# Patient Record
Sex: FEMALE | Race: WHITE
[De-identification: names, ages, dates, MRNs, and addresses within clinical notes are randomized per-mention and may not be internally consistent; named-entity substitution may affect disease eponyms.]

---

## 2017-06-13 ENCOUNTER — HOSPITAL ENCOUNTER (EMERGENCY)
Dept: HOSPITAL 17 - PHED | Age: 52
Discharge: HOME | End: 2017-06-13
Payer: COMMERCIAL

## 2017-06-13 VITALS
DIASTOLIC BLOOD PRESSURE: 89 MMHG | SYSTOLIC BLOOD PRESSURE: 136 MMHG | OXYGEN SATURATION: 99 % | RESPIRATION RATE: 20 BRPM | HEART RATE: 75 BPM

## 2017-06-13 VITALS
TEMPERATURE: 97.8 F | HEART RATE: 61 BPM | SYSTOLIC BLOOD PRESSURE: 143 MMHG | DIASTOLIC BLOOD PRESSURE: 94 MMHG | RESPIRATION RATE: 16 BRPM | OXYGEN SATURATION: 97 %

## 2017-06-13 VITALS — WEIGHT: 167.55 LBS | BODY MASS INDEX: 26.93 KG/M2 | HEIGHT: 66 IN

## 2017-06-13 DIAGNOSIS — N95.0: Primary | ICD-10-CM

## 2017-06-13 LAB
ALP SERPL-CCNC: 78 U/L (ref 45–117)
ALT SERPL-CCNC: 35 U/L (ref 10–53)
ANION GAP SERPL CALC-SCNC: 10 MEQ/L (ref 5–15)
AST SERPL-CCNC: 15 U/L (ref 15–37)
BASOPHILS # BLD AUTO: 0.1 TH/MM3 (ref 0–0.2)
BASOPHILS NFR BLD: 0.8 % (ref 0–2)
BILIRUB SERPL-MCNC: 0.7 MG/DL (ref 0.2–1)
BUN SERPL-MCNC: 13 MG/DL (ref 7–18)
CHLORIDE SERPL-SCNC: 109 MEQ/L (ref 98–107)
COLOR UR: YELLOW
COMMENT (UR): (no result)
CULTURE IF INDICATED: (no result)
EOSINOPHIL # BLD: 0.2 TH/MM3 (ref 0–0.4)
EOSINOPHIL NFR BLD: 2.3 % (ref 0–4)
ERYTHROCYTE [DISTWIDTH] IN BLOOD BY AUTOMATED COUNT: 12.4 % (ref 11.6–17.2)
GFR SERPLBLD BASED ON 1.73 SQ M-ARVRAT: 106 ML/MIN (ref 89–?)
GLUCOSE UR STRIP-MCNC: (no result) MG/DL
HCO3 BLD-SCNC: 25.5 MEQ/L (ref 21–32)
HCT VFR BLD CALC: 38.2 % (ref 35–46)
HEMO FLAGS: (no result)
HGB UR QL STRIP: (no result)
KETONES UR STRIP-MCNC: (no result) MG/DL
LEUKOCYTE ESTERASE UR QL STRIP: (no result) /HPF (ref 0–5)
LYMPHOCYTES # BLD AUTO: 2.4 TH/MM3 (ref 1–4.8)
LYMPHOCYTES NFR BLD AUTO: 27 % (ref 9–44)
MCH RBC QN AUTO: 30.7 PG (ref 27–34)
MCHC RBC AUTO-ENTMCNC: 33.9 % (ref 32–36)
MCV RBC AUTO: 90.5 FL (ref 80–100)
METHOD OF COLLECTION: (no result)
MONOCYTES NFR BLD: 5.2 % (ref 0–8)
MUCOUS THREADS #/AREA URNS LPF: (no result) /LPF
NEUTROPHILS # BLD AUTO: 5.5 TH/MM3 (ref 1.8–7.7)
NEUTROPHILS NFR BLD AUTO: 64.7 % (ref 16–70)
NITRITE UR QL STRIP: (no result)
PLATELET # BLD: 113 TH/MM3 (ref 150–450)
POTASSIUM SERPL-SCNC: 3.5 MEQ/L (ref 3.5–5.1)
RBC # BLD AUTO: 4.22 MIL/MM3 (ref 4–5.3)
RBC #/AREA URNS HPF: (no result) /HPF (ref 0–3)
SODIUM SERPL-SCNC: 144 MEQ/L (ref 136–145)
SP GR UR STRIP: 1.02 (ref 1–1.03)
SQUAMOUS #/AREA URNS HPF: (no result) /HPF (ref 0–5)
WBC # BLD AUTO: 8.7 TH/MM3 (ref 4–11)

## 2017-06-13 PROCEDURE — 85025 COMPLETE CBC W/AUTO DIFF WBC: CPT

## 2017-06-13 PROCEDURE — 96374 THER/PROPH/DIAG INJ IV PUSH: CPT

## 2017-06-13 PROCEDURE — 81001 URINALYSIS AUTO W/SCOPE: CPT

## 2017-06-13 PROCEDURE — 96361 HYDRATE IV INFUSION ADD-ON: CPT

## 2017-06-13 PROCEDURE — 76856 US EXAM PELVIC COMPLETE: CPT

## 2017-06-13 PROCEDURE — 80053 COMPREHEN METABOLIC PANEL: CPT

## 2017-06-13 PROCEDURE — 99285 EMERGENCY DEPT VISIT HI MDM: CPT

## 2017-06-13 NOTE — RADHPO
EXAM DATE/TIME:  2017 16:01 

 

[facility] COMPARISON:     

No previous studies available for comparison.

        

 

 

INDICATIONS :     

Post menopausal bleeding. 

                     

 

MEDICAL HISTORY :     

Pancreatitis.     Seizures. Bipolar disorder. Depression. Anxiety. 

 

SURGICAL HISTORY :     

Tonsillectomy. Cholecystectomy.  section. Removal of choledocholithiasis. 

 

ENCOUNTER:     

Initial

 

ACUITY:     

2 days

 

PAIN SCORE:     

1/10

 

LOCATION:     

Bilateral pelvis 

                     

MEASUREMENTS:     

 

UTERUS:                                  

10.4 x 4.3 x 6.4 cm 

 

ENDOMETRIAL STRIPE:      

5 mm 

 

RIGHT OVARY:                      

1.9 x 1.2 x 1.2 cm 

 

LEFT OVARY:                         

2.4 x 1.2 x 1.8 cm 

 

FINDINGS:     

 

UTERUS:     

The myometrium has homogeneous echotexture without mass.  Endometrial stripe is homogeneous.

 

RIGHT OVARY:     

Ovary contains no mass or significant cystic lesion.  

 

LEFT OVARY:     

Ovary contains no mass or significant cystic lesion.  

 

MISCELLANEOUS:     

No free fluid.  

 

CONCLUSION:     

1. Unremarkable pelvic ultrasound examination. Specifically, homogeneous normal appearing endometrial
 stripe. 

 

 

 Jermaine Howard MD on 2017 at 16:25           

Board Certified Radiologist.

 This report was verified electronically.

## 2017-06-13 NOTE — PD
HPI


Chief Complaint:  Gyn Problem/Complaint


Time Seen by Provider:  15:10


Travel History


International Travel<30 days:  No


Contact w/Intl Traveler<30days:  No


Traveled to known affect area:  No





History of Present Illness


HPI


52-year-old female complains of vaginal bleeding.  Patient states that she went 

through menopause at age 48 which is 4 years ago.  Patient started having low 

back pain and abdominal cramping pain about 10 days ago.  Patient states that 

the pain lasted for several days.  Patient started having vaginal bleeding for 

the past 6 days since then.  Patient denies any headache.  Patient denies any 

chest pain or shortness of breath.  Patient denies abdominal pain now.  Patient 

denies any dysuria or frequency.  Patient denies any fever chills.  Patient 

denies any back pain now.  Patient has history of anxiety and taking Valium as 

needed for that.





PFSH


Past Medical History


Hx Anticoagulant Therapy:  No


ADD:  Yes


Bipolar Disorder:  Yes


Anxiety:  Yes


Depression:  Yes


Diabetes:  No


Diminished Hearing:  No


Pancreatitis:  Yes


Pregnant?:  Not Pregnant


Menopausal:  Yes





Past Surgical History


 Section:  Yes (, , )


Cholecystectomy:  Yes ()


Pacemaker:  No


Tonsillectomy:  Yes (CHILDHOOD)





Social History


Alcohol Use:  No (PT STATES IN RECOVERY started 5 wks.ago)


Tobacco Use:  No (QUIT 5 WKS. AGO)


Substance Use:  Yes (MARIJUANA))





Allergies-Medications


(Allergen,Severity, Reaction):  


Coded Allergies:  


     No Known Allergies (Verified , 17)


Reported Meds & Prescriptions





Reported Meds & Active Scripts


Active


Reported


Estradiol 1 Mg Tab 1 Mg PO DAILY


Trazodone (Trazodone HCl) 100 Mg Tablet 150 Mg PO BID


Diazepam 5 Mg Tab 5 Mg PO Q4HR PRN


Sertraline (Sertraline HCl) 100 Mg Tab 100 Mg PO DAILY


Progesterone Micronized 100 Mg Cap 100 Mg PO DAILY


Temazepam 15 Mg Cap 15 Mg PO HS PRN








Review of Systems


General / Constitutional:  No: Fever


Eyes:  No: Visual changes


HENT:  No: Headaches


Cardiovascular:  No: Chest Pain or Discomfort


Respiratory:  No: Shortness of Breath


Gastrointestinal:  Positive: Abdominal Pain


Genitourinary:  Positive: Pelvic Pain, Vaginal Bleeding,  No: Dysuria


Musculoskeletal:  No: Pain


Skin:  No Rash


Neurologic:  No: Weakness


Psychiatric:  No: Depression


Endocrine:  No: Polydipsia


Hematologic/Lymphatic:  No: Easy Bruising





Physical Exam


Narrative


GENERAL: Well-nourished, well-developed patient.


SKIN: Focused skin assessment warm/dry.


HEAD: Normocephalic.


EYES: No scleral icterus. No injection or drainage. 


NECK: Supple, trachea midline. No JVD or lymphadenopathy.


CARDIOVASCULAR: Regular rate and rhythm without murmurs, gallops, or rubs. 


RESPIRATORY: Breath sounds equal bilaterally. No accessory muscle use.


GASTROINTESTINAL: Abdomen soft, non-tender, nondistended. 


MUSCULOSKELETAL: No cyanosis, or edema. 


BACK: Nontender without obvious deformity. No CVA tenderness. 


GYN exam: Patient has small amount of blood in the vaginal vault.  The cervix 

is closed.  Uterus is mildly enlarged and nontender on palpation.  No adnexal 

mass or tenderness.





Data


Data


Last Documented VS





Vital Signs








  Date Time  Temp Pulse Resp B/P Pulse Ox O2 Delivery O2 Flow Rate FiO2


 


17 15:48  75 20 136/89 99 Room Air  


 


17 14:25 97.8       








Orders





 Complete Blood Count With Diff (17 15:18)


Comprehensive Metabolic Panel (17 15:18)


Us Pelvis Comp Gyn/Non-Preg (17 )


Urinalysis - C+S If Indicated (17 15:18)


Iv Access Insert/Monitor (17 15:18)


Ecg Monitoring (17 15:18)


Sodium Chlor 0.9% 1000 Ml Inj (Ns 1000 M (17 15:30)


Lorazepam Inj (Ativan Inj) (17 15:30)








MDM


Medical Decision Making


Medical Screen Exam Complete:  Yes


Emergency Medical Condition:  Yes


Differential Diagnosis


Differential diagnosis including breakthrough bleeding, postmenopausal bleeding

, uterine mass.


Narrative Course


52-year-old female complains of postmenopausal vaginal bleeding.





Diagnosis





 Primary Impression:  


 Post-menopausal bleeding








Jerzy Dozier MD 2017 15:25

## 2017-06-13 NOTE — PD
Physical Exam


Narrative


Received sign out from previous team to follow up labs and US.





53yo F with post menopausal vaginal bleeding for 6 days.  VS stable.  Labs 

reviewed, no leukocytosis.  H/H stable at 12.9/38.2.  Mild thrombocytopenia at 

113,000, pt had mild thrombocytopenia before.  CMP unremarkable.  Pt has no 

abdominal tenderness on exam.  US unremarkable.  Normal endometrial stripe.  Pt 

states she is on estrogen and progesterone for menopausal symptoms.  This 

bleeding may be related to hormone use but pt still needs to follow up with GYN 

for endometrial biopsy to r/o endometrial cancer in vaginal bleeding in post 

menopausal woman.  Instructed pt to follow up with GYN as outpatient.





Data


Data


Last Documented VS





Vital Signs








  Date Time  Temp Pulse Resp B/P Pulse Ox O2 Delivery O2 Flow Rate FiO2


 


6/13/17 15:48  75 20 136/89 99 Room Air  


 


6/13/17 14:25 97.8       








Orders





 Complete Blood Count With Diff (6/13/17 15:18)


Comprehensive Metabolic Panel (6/13/17 15:18)


Us Pelvis Comp Gyn/Non-Preg (6/13/17 )


Urinalysis - C+S If Indicated (6/13/17 15:18)


Iv Access Insert/Monitor (6/13/17 15:18)


Ecg Monitoring (6/13/17 15:18)


Sodium Chlor 0.9% 1000 Ml Inj (Ns 1000 M (6/13/17 15:30)


Lorazepam Inj (Ativan Inj) (6/13/17 15:30)





Labs





 Laboratory Tests








Test 6/13/17 6/13/17





 15:20 15:33


 


Urine Collection Type CLEAN CATCH  


 


Urine Color YELLOW  


 


Urine Turbidity CLEAR  


 


Urine pH 6.0  


 


Urine Specific Gravity 1.025  


 


Urine Protein NEG mg/dL 


 


Urine Glucose (UA) NEG mg/dL 


 


Urine Ketones NEG mg/dL 


 


Urine Occult Blood LARGE  


 


Urine Nitrite NEG  


 


Urine Bilirubin NEG  


 


Urine Leukocyte Esterase NEG  


 


Urine RBC 15-19 /hpf 


 


Urine WBC 0-2 /hpf 


 


Urine Squamous Epithelial 6-8 /hpf 





Cells  


 


Urine Mucus FEW /lpf 


 


Microscopic Urinalysis Comment CULT NOT 





 INDICATED 


 


Urine Collection Time 15:20  


 


White Blood Count  8.7 TH/MM3


 


Red Blood Count  4.22 MIL/MM3


 


Hemoglobin  12.9 GM/DL


 


Hematocrit  38.2 %


 


Mean Corpuscular Volume  90.5 FL


 


Mean Corpuscular Hemoglobin  30.7 PG


 


Mean Corpuscular Hemoglobin  33.9 %





Concent  


 


Red Cell Distribution Width  12.4 %


 


Platelet Count  113 TH/MM3


 


Mean Platelet Volume  10.1 FL


 


Neutrophils (%) (Auto)  64.7 %


 


Lymphocytes (%) (Auto)  27.0 %


 


Monocytes (%) (Auto)  5.2 %


 


Eosinophils (%) (Auto)  2.3 %


 


Basophils (%) (Auto)  0.8 %


 


Neutrophils # (Auto)  5.5 TH/MM3


 


Lymphocytes # (Auto)  2.4 TH/MM3


 


Monocytes # (Auto)  0.5 TH/MM3


 


Eosinophils # (Auto)  0.2 TH/MM3


 


Basophils # (Auto)  0.1 TH/MM3


 


CBC Comment  DIFF FINAL 


 


Differential Comment   


 


Sodium Level  144 MEQ/L


 


Potassium Level  3.5 MEQ/L


 


Chloride Level  109 MEQ/L


 


Carbon Dioxide Level  25.5 MEQ/L


 


Anion Gap  10 MEQ/L


 


Blood Urea Nitrogen  13 MG/DL


 


Creatinine  0.59 MG/DL


 


Estimat Glomerular Filtration  106 ML/MIN





Rate  


 


Random Glucose  91 MG/DL


 


Calcium Level  8.5 MG/DL


 


Total Bilirubin  0.7 MG/DL


 


Aspartate Amino Transf  15 U/L





(AST/SGOT)  


 


Alanine Aminotransferase  35 U/L





(ALT/SGPT)  


 


Alkaline Phosphatase  78 U/L


 


Total Protein  7.1 GM/DL


 


Albumin  3.8 GM/DL











MDM


Supervised Visit with EZEQUIEL:  No


Diagnosis





 Primary Impression:  


 Post-menopausal bleeding


Referrals:  


Heather Head MD


call for appointment


Post menopausal bleeding.


Patient Instructions:  General Instructions


Departure Forms:  Tests/Procedures





***Additional Instruction:


Please follow up with gynecologist in 1-2 days for work up of post menopausal 

bleeding.


***Med/Other Pt SpecificInfo:  No Change to Meds


Disposition:  01 DISCHARGE HOME


Condition:  Stable (ERASED)








Ciara Crabtree DO Jun 13, 2017 16:34

## 2018-06-04 ENCOUNTER — HOSPITAL ENCOUNTER (EMERGENCY)
Dept: HOSPITAL 17 - PHED | Age: 53
Discharge: HOME | End: 2018-06-04
Payer: SELF-PAY

## 2018-06-04 VITALS — HEART RATE: 87 BPM | RESPIRATION RATE: 16 BRPM | TEMPERATURE: 97.5 F | OXYGEN SATURATION: 100 %

## 2018-06-04 VITALS — DIASTOLIC BLOOD PRESSURE: 92 MMHG | SYSTOLIC BLOOD PRESSURE: 140 MMHG

## 2018-06-04 VITALS — HEIGHT: 65 IN | WEIGHT: 180.78 LBS | BODY MASS INDEX: 30.12 KG/M2

## 2018-06-04 DIAGNOSIS — F12.90: ICD-10-CM

## 2018-06-04 DIAGNOSIS — K29.00: Primary | ICD-10-CM

## 2018-06-04 DIAGNOSIS — Z87.891: ICD-10-CM

## 2018-06-04 DIAGNOSIS — K51.90: ICD-10-CM

## 2018-06-04 DIAGNOSIS — F31.9: ICD-10-CM

## 2018-06-04 DIAGNOSIS — F41.9: ICD-10-CM

## 2018-06-04 LAB
ALBUMIN SERPL-MCNC: 4 GM/DL (ref 3.4–5)
ALP SERPL-CCNC: 103 U/L (ref 45–117)
ALT SERPL-CCNC: 58 U/L (ref 10–53)
AST SERPL-CCNC: 31 U/L (ref 15–37)
BACTERIA #/AREA URNS HPF: (no result) /HPF
BASOPHILS # BLD AUTO: 0.2 TH/MM3 (ref 0–0.2)
BASOPHILS NFR BLD: 1.9 % (ref 0–2)
BILIRUB SERPL-MCNC: 0.8 MG/DL (ref 0.2–1)
BUN SERPL-MCNC: 12 MG/DL (ref 7–18)
CALCIUM SERPL-MCNC: 8.9 MG/DL (ref 8.5–10.1)
CHLORIDE SERPL-SCNC: 106 MEQ/L (ref 98–107)
COLOR UR: YELLOW
CREAT SERPL-MCNC: 0.57 MG/DL (ref 0.5–1)
EOSINOPHIL # BLD: 0.2 TH/MM3 (ref 0–0.4)
EOSINOPHIL NFR BLD: 2.2 % (ref 0–4)
ERYTHROCYTE [DISTWIDTH] IN BLOOD BY AUTOMATED COUNT: 11.8 % (ref 11.6–17.2)
GFR SERPLBLD BASED ON 1.73 SQ M-ARVRAT: 110 ML/MIN (ref 89–?)
GLUCOSE SERPL-MCNC: 88 MG/DL (ref 74–106)
GLUCOSE UR STRIP-MCNC: (no result) MG/DL
HCO3 BLD-SCNC: 23 MEQ/L (ref 21–32)
HCT VFR BLD CALC: 44.1 % (ref 35–46)
HGB BLD-MCNC: 15.2 GM/DL (ref 11.6–15.3)
HGB UR QL STRIP: (no result)
INR PPP: 1.1 RATIO
KETONES UR STRIP-MCNC: (no result) MG/DL
LYMPHOCYTES # BLD AUTO: 2.9 TH/MM3 (ref 1–4.8)
LYMPHOCYTES NFR BLD AUTO: 30.2 % (ref 9–44)
MCH RBC QN AUTO: 30.9 PG (ref 27–34)
MCHC RBC AUTO-ENTMCNC: 34.5 % (ref 32–36)
MCV RBC AUTO: 89.4 FL (ref 80–100)
MONOCYTE #: 0.7 TH/MM3 (ref 0–0.9)
MONOCYTES NFR BLD: 7.4 % (ref 0–8)
MUCOUS THREADS #/AREA URNS LPF: (no result) /LPF
NEUTROPHILS # BLD AUTO: 5.5 TH/MM3 (ref 1.8–7.7)
NEUTROPHILS NFR BLD AUTO: 58.3 % (ref 16–70)
NITRITE UR QL STRIP: (no result)
PLATELET # BLD: 146 TH/MM3 (ref 150–450)
PMV BLD AUTO: 9.6 FL (ref 7–11)
PROT SERPL-MCNC: 7.9 GM/DL (ref 6.4–8.2)
PROTHROMBIN TIME: 11.2 SEC (ref 9.8–11.6)
RBC # BLD AUTO: 4.94 MIL/MM3 (ref 4–5.3)
RBC #/AREA URNS HPF: (no result) /HPF (ref 0–3)
SODIUM SERPL-SCNC: 138 MEQ/L (ref 136–145)
SP GR UR STRIP: 1.02 (ref 1–1.03)
SQUAMOUS #/AREA URNS HPF: (no result) /HPF (ref 0–5)
TROPONIN I SERPL-MCNC: (no result) NG/ML (ref 0.02–0.05)
URINE LEUKOCYTE ESTERASE: (no result)
WBC # BLD AUTO: 9.5 TH/MM3 (ref 4–11)

## 2018-06-04 PROCEDURE — 81001 URINALYSIS AUTO W/SCOPE: CPT

## 2018-06-04 PROCEDURE — 74177 CT ABD & PELVIS W/CONTRAST: CPT

## 2018-06-04 PROCEDURE — 83690 ASSAY OF LIPASE: CPT

## 2018-06-04 PROCEDURE — 85730 THROMBOPLASTIN TIME PARTIAL: CPT

## 2018-06-04 PROCEDURE — 85025 COMPLETE CBC W/AUTO DIFF WBC: CPT

## 2018-06-04 PROCEDURE — 96375 TX/PRO/DX INJ NEW DRUG ADDON: CPT

## 2018-06-04 PROCEDURE — 96361 HYDRATE IV INFUSION ADD-ON: CPT

## 2018-06-04 PROCEDURE — 99285 EMERGENCY DEPT VISIT HI MDM: CPT

## 2018-06-04 PROCEDURE — 85610 PROTHROMBIN TIME: CPT

## 2018-06-04 PROCEDURE — 84484 ASSAY OF TROPONIN QUANT: CPT

## 2018-06-04 PROCEDURE — 80053 COMPREHEN METABOLIC PANEL: CPT

## 2018-06-04 PROCEDURE — C9113 INJ PANTOPRAZOLE SODIUM, VIA: HCPCS

## 2018-06-04 PROCEDURE — 96374 THER/PROPH/DIAG INJ IV PUSH: CPT

## 2018-06-04 NOTE — PD
HPI


Chief Complaint:  Abdominal Pain


Time Seen by Provider:  19:08


Travel History


International Travel<30 days:  No


Contact w/Intl Traveler<30days:  No


Traveled to known affect area:  No





History of Present Illness


HPI


53-year-old female presented the ER for evaluation of abdominal pain.  

Abdominal pain rated 8 out of 10, comes and goes for the last few days, located 

in the right upper quadrant, dull in nature, nothing makes it better but 

movement makes it worse, associated with nausea but no vomiting.  Patient is 

been having the pain for the last 3 days but decided to come to the ER today 

since the pain is been getting worse.  She has no fever or shortness of breath 

or chest pain, she has no diarrhea and have a normal bowel movement.  Patient 

has history of gastritis in the past but states she does not take anything for 

it, she also has history of post manic stress disorder and ulcerative colitis.  

Patient states that she does not take anything for her ulcerative colitis, she 

only took half a pill of her psychiatric medications but she was about to throw 

it up, she tried cranberry juice for her pain but that did not help.  Patient 

had a colonoscopy about 2 years ago in an outside hospital and she states that 

how she was diagnosed with ulcerative colitis.





PFSH


Past Medical History


Hx Anticoagulant Therapy:  No


ADD:  Yes


Bipolar Disorder:  Yes


Anxiety:  Yes


Depression:  Yes


Diabetes:  No


Diminished Hearing:  No


Pancreatitis:  Yes


Pregnant?:  Not Pregnant


Menopausal:  Yes





Past Surgical History


 Section:  Yes (, , )


Cholecystectomy:  Yes ()


Pacemaker:  No


Tonsillectomy:  Yes (CHILDHOOD)





Social History


Alcohol Use:  No (PT STATES IN RECOVERY started 5 wks.ago)


Tobacco Use:  No (QUIT 5 WKS. AGO)


Substance Use:  Yes (MARIJUANA))





Allergies-Medications


(Allergen,Severity, Reaction):  


Coded Allergies:  


     No Known Allergies (Verified  Allergy, Unknown, 18)


Reported Meds & Prescriptions





Reported Meds & Active Scripts


Active


Reported


Tylenol (Acetaminophen) 325 Mg Tab 650 Mg PO Q4H PRN


Ibuprofen 400 Mg Tab 400 Mg PO Q6H PRN


Venlafaxine ER 24 HR (Venlafaxine HCl) 150 Mg Cap 150 Mg PO DAILY


Xanax (Alprazolam) 2 Mg Tab 2 Mg PO Q8H PRN


Trazodone (Trazodone HCl) 100 Mg Tablet 150 Mg PO BID


Temazepam 15 Mg Cap 15 Mg PO HS PRN








Review of Systems


Except as stated in HPI:  all other systems reviewed are Neg





Physical Exam


Narrative


GENERAL: Alert oriented 3 no acute distress


SKIN: Focused skin assessment warm/dry.


HEAD: Atraumatic. Normocephalic. 


EYES: Pupils equal and round. No scleral icterus. No injection or drainage. 


ENT: No nasal bleeding or discharge.  Mucous membranes pink and moist.


NECK: Trachea midline. No JVD. 


CARDIOVASCULAR: Regular rate and rhythm.  No murmur appreciated.


RESPIRATORY: No accessory muscle use. Clear to auscultation. Breath sounds 

equal bilaterally. 


GASTROINTESTINAL: Mild right upper quadrant tenderness without rebound, no 

masses, abdomen soft, distended. Hepatic and splenic margins not palpable. 


MUSCULOSKELETAL: No obvious deformities. No clubbing.  No cyanosis.  No edema. 


NEUROLOGICAL: Awake and alert. No obvious cranial nerve deficits.  Motor 

grossly within normal limits. Normal speech.


PSYCHIATRIC: Appropriate mood and affect; insight and judgment normal.





Data


Data


Last Documented VS





Vital Signs








  Date Time  Temp Pulse Resp B/P (MAP) Pulse Ox O2 Delivery O2 Flow Rate FiO2


 


18 19:05   20     


 


18 18:48 97.5 87   100   








Orders





 Orders


Complete Blood Count With Diff (18 19:08)


Comprehensive Metabolic Panel (18 19:08)


Lipase (18 19:08)


Act Partial Throm Time (Ptt) (18 19:08)


Prothrombin Time / Inr (Pt) (18 19:08)


Troponin I (18 19:08)


Urinalysis - C+S If Indicated (18 19:08)


Ct Abd/Pel W Iv Contrast(Rout) (18 )


Lorazepam Inj (Ativan Inj) (18 19:15)


Sodium Chlor 0.9% 1000 Ml Inj (Ns 1000 M (18 19:15)


Pantoprazole Inj (Protonix Inj) (18 19:15)


Iohexol 350 Inj (Omnipaque 350 Inj) (18 20:17)


Potassium Chloride (Kcl) (18 20:30)


Ed Discharge Order (18 20:53)





Labs





Laboratory Tests








Test


  18


19:30


 


White Blood Count 9.5 TH/MM3 


 


Red Blood Count 4.94 MIL/MM3 


 


Hemoglobin 15.2 GM/DL 


 


Hematocrit 44.1 % 


 


Mean Corpuscular Volume 89.4 FL 


 


Mean Corpuscular Hemoglobin 30.9 PG 


 


Mean Corpuscular Hemoglobin


Concent 34.5 % 


 


 


Red Cell Distribution Width 11.8 % 


 


Platelet Count 146 TH/MM3 


 


Mean Platelet Volume 9.6 FL 


 


Neutrophils (%) (Auto) 58.3 % 


 


Lymphocytes (%) (Auto) 30.2 % 


 


Monocytes (%) (Auto) 7.4 % 


 


Eosinophils (%) (Auto) 2.2 % 


 


Basophils (%) (Auto) 1.9 % 


 


Neutrophils # (Auto) 5.5 TH/MM3 


 


Lymphocytes # (Auto) 2.9 TH/MM3 


 


Monocytes # (Auto) 0.7 TH/MM3 


 


Eosinophils # (Auto) 0.2 TH/MM3 


 


Basophils # (Auto) 0.2 TH/MM3 


 


CBC Comment DIFF FINAL 


 


Differential Comment  


 


Prothrombin Time 11.2 SEC 


 


Prothromb Time International


Ratio 1.1 RATIO 


 


 


Activated Partial


Thromboplast Time 24.1 SEC 


 


 


Urine Color YELLOW 


 


Urine Turbidity CLEAR 


 


Urine pH 6.0 


 


Urine Specific Gravity 1.020 


 


Urine Protein NEG mg/dL 


 


Urine Glucose (UA) NEG mg/dL 


 


Urine Ketones NEG mg/dL 


 


Urine Occult Blood NEG 


 


Urine Nitrite NEG 


 


Urine Bilirubin NEG 


 


Urine Urobilinogen 0.2 MG/DL 


 


Urine Leukocyte Esterase NEG 


 


Urine RBC 0-3 /hpf 


 


Urine Squamous Epithelial


Cells 0-5 /hpf 


 


 


Urine Bacteria RARE /hpf 


 


Urine Mucus FEW /lpf 


 


Microscopic Urinalysis Comment


  CULT NOT


INDICATED


 


Blood Urea Nitrogen 12 MG/DL 


 


Creatinine 0.57 MG/DL 


 


Random Glucose 88 MG/DL 


 


Total Protein 7.9 GM/DL 


 


Albumin 4.0 GM/DL 


 


Calcium Level 8.9 MG/DL 


 


Alkaline Phosphatase 103 U/L 


 


Aspartate Amino Transf


(AST/SGOT) 31 U/L 


 


 


Alanine Aminotransferase


(ALT/SGPT) 58 U/L 


 


 


Total Bilirubin 0.8 MG/DL 


 


Sodium Level 138 MEQ/L 


 


Potassium Level 3.2 MEQ/L 


 


Chloride Level 106 MEQ/L 


 


Carbon Dioxide Level 23.0 MEQ/L 


 


Anion Gap 9 MEQ/L 


 


Estimat Glomerular Filtration


Rate 110 ML/MIN 


 


 


Troponin I


  LESS THAN 0.02


NG/ML


 


Lipase 122 U/L 











MDM


Medical Decision Making


Medical Screen Exam Complete:  Yes


Emergency Medical Condition:  Yes


Differential Diagnosis


Gastritis, GERD, perforation, peptic ulcer disease.


Narrative Course


53-year-old female here for evaluation of abdominal pain.  Physical examination 

is unremarkable, vitals are stable and she is not in acute distress.  Patient 

was given IV Protonix and she dramatically improved, labs are within normal 

limits, scan shows no acute findings.  Patient denies any vomiting or any blood 

in the stool and says that she feels much better after the Protonix.  I will 

give the patient a trial of Protonix to take home with her meanwhile I 

encouraged her to follow-up with his [facility] clinic and to return here if symptoms 

change or do not improve.  Patient may need EGD to rule out any peptic ulcer 

disease and she may need workup for H pylori if her symptoms continue to mother 

and I explained those issues with the patient and she understands.


Last 24 hours Impressions








Abdomen/Pelvis CT 18 0000 Signed





Impressions: 





 CONCLUSION:





 1.  No acute findings on abdomen and pelvic CT. Mild fatty liver. Previous chol





 ecystectomy.





  





 








 Laboratory Tests








Test


  18


19:30


 


White Blood Count 9.5 TH/MM3 


 


Red Blood Count 4.94 MIL/MM3 


 


Hemoglobin 15.2 GM/DL 


 


Hematocrit 44.1 % 


 


Mean Corpuscular Volume 89.4 FL 


 


Mean Corpuscular Hemoglobin 30.9 PG 


 


Mean Corpuscular Hemoglobin


Concent 34.5 % 


 


 


Red Cell Distribution Width 11.8 % 


 


Platelet Count 146 TH/MM3 


 


Mean Platelet Volume 9.6 FL 


 


Neutrophils (%) (Auto) 58.3 % 


 


Lymphocytes (%) (Auto) 30.2 % 


 


Monocytes (%) (Auto) 7.4 % 


 


Eosinophils (%) (Auto) 2.2 % 


 


Basophils (%) (Auto) 1.9 % 


 


Neutrophils # (Auto) 5.5 TH/MM3 


 


Lymphocytes # (Auto) 2.9 TH/MM3 


 


Monocytes # (Auto) 0.7 TH/MM3 


 


Eosinophils # (Auto) 0.2 TH/MM3 


 


Basophils # (Auto) 0.2 TH/MM3 


 


CBC Comment DIFF FINAL 


 


Differential Comment  


 


Prothrombin Time 11.2 SEC 


 


Prothromb Time International


Ratio 1.1 RATIO 


 


 


Activated Partial


Thromboplast Time 24.1 SEC 


 


 


Urine Color YELLOW 


 


Urine Turbidity CLEAR 


 


Urine pH 6.0 


 


Urine Specific Gravity 1.020 


 


Urine Protein NEG mg/dL 


 


Urine Glucose (UA) NEG mg/dL 


 


Urine Ketones NEG mg/dL 


 


Urine Occult Blood NEG 


 


Urine Nitrite NEG 


 


Urine Bilirubin NEG 


 


Urine Urobilinogen 0.2 MG/DL 


 


Urine Leukocyte Esterase NEG 


 


Urine RBC 0-3 /hpf 


 


Urine Squamous Epithelial


Cells 0-5 /hpf 


 


 


Urine Bacteria RARE /hpf 


 


Urine Mucus FEW /lpf 


 


Microscopic Urinalysis Comment


  CULT NOT


INDICATED


 


Blood Urea Nitrogen 12 MG/DL 


 


Creatinine 0.57 MG/DL 


 


Random Glucose 88 MG/DL 


 


Total Protein 7.9 GM/DL 


 


Albumin 4.0 GM/DL 


 


Calcium Level 8.9 MG/DL 


 


Alkaline Phosphatase 103 U/L 


 


Aspartate Amino Transf


(AST/SGOT) 31 U/L 


 


 


Alanine Aminotransferase


(ALT/SGPT) 58 U/L 


 


 


Total Bilirubin 0.8 MG/DL 


 


Sodium Level 138 MEQ/L 


 


Potassium Level 3.2 MEQ/L 


 


Chloride Level 106 MEQ/L 


 


Carbon Dioxide Level 23.0 MEQ/L 


 


Anion Gap 9 MEQ/L 


 


Estimat Glomerular Filtration


Rate 110 ML/MIN 


 


 


Troponin I


  LESS THAN 0.02


NG/ML


 


Lipase 122 U/L 











Diagnosis





 Primary Impression:  


 Gastritis


 Qualified Codes:  K29.00 - Acute gastritis without bleeding


Referrals:  


[facility]





***Additional Instructions:  


Follow-up with the clinic and return here if symptoms change or do not improve.


Scripts


Pantoprazole (Protonix) 40 Mg Tab


40 MG PO DAILY for Reflux, #14 TAB 0 Refills


   Prov: Javy Alfonso MD         18


Disposition:   DISCHARGE HOME


Condition:  Stable











Javy Alfonso MD 2018 19:14

## 2018-06-04 NOTE — RADRPT
EXAM DATE:  2018 8:20 PM EDT

AGE/SEX:        53 years / Female



INDICATIONS:  Right upper quadrant pain for two days.



CLINICAL DATA:  This is the patient's initial encounter. Patient reports that signs and symptoms have
 been present for 2 days and indicates a pain score of 8/10. 

                                                                          

MEDICAL/SURGICAL HISTORY:       Pancreatitis. Cholecystectomy.   section.



ORAL CONTRAST:   No oral contrast ingested.



RADIATION DOSE:  17.18 CTDI (mGy)









COMPARISON:      No prior [facility] exams available for comparison. 





TECHNIQUE:  Multiple contiguous axial images were obtained through the abdomen and pelvis following b
olus infusion of  100 ml Omnipaque 350 (iohexol)  nonionic water-soluble contrast as a single exam do
se. No oral contrast ingested.  Using automated exposure control and adjustment of the mA and/or kV a
ccording to patient size, the radiation dose was kept as low as reasonably achievable to obtain optim
al diagnostic quality images.



FINDINGS: 

Lung bases are clear.



Mild fatty liver. Spleen, adrenals, kidneys and pancreas unremarkable. Postoperative cholecystectomy.




There is no free air or free fluid. No bowel obstruction. No adenopathy. No acute bony abnormalities.




CONCLUSION:

1.  No acute findings on abdomen and pelvic CT. Mild fatty liver. Previous cholecystectomy.



Electronically signed by: Andrei Talavera MD  2018 8:40 PM EDT

## 2018-06-21 ENCOUNTER — HOSPITAL ENCOUNTER (EMERGENCY)
Dept: HOSPITAL 17 - PHEFT | Age: 53
Discharge: HOME | End: 2018-06-21
Payer: SELF-PAY

## 2018-06-21 VITALS — BODY MASS INDEX: 28.34 KG/M2 | HEIGHT: 66 IN | WEIGHT: 176.37 LBS

## 2018-06-21 VITALS
RESPIRATION RATE: 18 BRPM | OXYGEN SATURATION: 96 % | HEART RATE: 80 BPM | TEMPERATURE: 97.2 F | SYSTOLIC BLOOD PRESSURE: 152 MMHG | DIASTOLIC BLOOD PRESSURE: 90 MMHG

## 2018-06-21 DIAGNOSIS — K08.89: Primary | ICD-10-CM

## 2018-06-21 DIAGNOSIS — F31.9: ICD-10-CM

## 2018-06-21 DIAGNOSIS — K21.9: ICD-10-CM

## 2018-06-21 DIAGNOSIS — K05.10: ICD-10-CM

## 2018-06-21 DIAGNOSIS — Z87.891: ICD-10-CM

## 2018-06-21 DIAGNOSIS — F41.8: ICD-10-CM

## 2018-06-21 DIAGNOSIS — F98.8: ICD-10-CM

## 2018-06-21 PROCEDURE — 99283 EMERGENCY DEPT VISIT LOW MDM: CPT

## 2018-06-21 PROCEDURE — 96372 THER/PROPH/DIAG INJ SC/IM: CPT

## 2018-06-21 NOTE — PD
HPI


Chief Complaint:  Oral / Dental Pain or Problem


Time Seen by Provider:  17:57


Travel History


International Travel<30 days:  No


Contact w/Intl Traveler<30days:  No


Traveled to known affect area:  No





History of Present Illness


HPI


53-year-old female presents emergency department for evaluation of left 

maxillary second molar pain.  Pain has been ongoing for several weeks, but has 

become acutely worse over the last 2-3 days.  She denies any trauma.  No fever 

chills.  States pain is a constant, throbbing, 10 out of 10.  She feels like it 

is worse when she tries to bite down.  She denies any fever or chills.  She 

denies any nausea, vomiting, diarrhea.  She has no other symptoms to report.  

She does not have a dentist.





PFSH


Past Medical History


Hx Anticoagulant Therapy:  No


ADD:  Yes


Bipolar Disorder:  Yes


Anxiety:  Yes


Depression:  Yes


Diabetes:  No


Diminished Hearing:  No


GERD:  Yes


Neurologic:  Yes (PTSD)


Pancreatitis:  Yes


Pregnant?:  Not Pregnant


Menopausal:  Yes


:  4


Para:  4





Past Surgical History


Abdominal Surgery:  Yes (ENDOSCOPY)


 Section:  Yes (, , )


Cholecystectomy:  Yes ()


Pacemaker:  No


Tonsillectomy:  Yes (CHILDHOOD)





Social History


Alcohol Use:  No


Tobacco Use:  No (STATES QUIT 18)


Substance Use:  Yes (MARIJUANA))





Allergies-Medications


(Allergen,Severity, Reaction):  


Coded Allergies:  


     No Known Allergies (Verified  Allergy, Unknown, 18)


Reported Meds & Prescriptions





Reported Meds & Active Scripts


Active


Protonix (Pantoprazole Sodium) 40 Mg Tab 40 Mg PO DAILY


Reported


Tylenol (Acetaminophen) 325 Mg Tab 650 Mg PO Q4H PRN


Ibuprofen 400 Mg Tab 400 Mg PO Q6H PRN


Venlafaxine ER 24 HR (Venlafaxine HCl) 150 Mg Cap 150 Mg PO DAILY


Xanax (Alprazolam) 2 Mg Tab 2 Mg PO Q8H PRN


Trazodone (Trazodone HCl) 100 Mg Tablet 150 Mg PO BID


Temazepam 15 Mg Cap 15 Mg PO HS PRN








Review of Systems


Except as stated in HPI:  all other systems reviewed are Neg





Physical Exam


Narrative


GENERAL: Well-nourished, well-developed female patient in no acute distress


SKIN: Focused skin assessment warm/dry.


HEAD: Normocephalic.  No erythema or edema


EYES: No scleral icterus. No injection or drainage. 


ENT: Mucosa pink and moist. No erythema or exudates. No uvular edema. No uvular

, palatal, or tonsillar deviation. Airway patent. Nasal turbinates appear 

normal without nasal blood, purulent drainage or septal hematoma.


DENTAL: No loose or chipped teeth.  There is a cavity in the left maxillary 

second molar.  Mild gingival erythema and edema.  No appreciable abscess.  No 

malocclusion.


NECK: Supple, trachea midline. No JVD or lymphadenopathy.


CARDIOVASCULAR: Regular rate and rhythm without murmurs, gallops, or rubs. 


RESPIRATORY: Breath sounds equal bilaterally. No accessory muscle use.





Data


Data


Last Documented VS





Vital Signs








  Date Time  Temp Pulse Resp B/P (MAP) Pulse Ox O2 Delivery O2 Flow Rate FiO2


 


18 17:46 97.2 80 18 152/90 (110) 96   








Orders





 Orders


Ketorolac Inj (Toradol Inj) (18 18:15)


Ed Discharge Order (18 18:05)








MDM


Medical Decision Making


Medical Screen Exam Complete:  Yes


Emergency Medical Condition:  Yes


Medical Record Reviewed:  Yes


Differential Diagnosis


Dental caries versus pulpitis versus gingivitis versus periodontal disease


Narrative Course


53-year-old female presents emergency department for evaluation of dental pain.

  There is a dental carry with mild gingival erythema and edema of the left 

maxillary second molar.  Patient will be treated for pain and started on oral 

antibiotics and Peridex oral rinse.  She is encouraged to seek dental 

evaluation.  She agrees to return immediately with acute worsening symptoms.





Diagnosis





 Primary Impression:  


 Dentalgia


 Additional Impression:  


 Gingivitis


Referrals:  


Dentist





Primary Care Physician


Patient Instructions:  Dental Caries (ED), General Instructions





***Additional Instructions:  


Seek dental evaluation


Follow-up with a primary care provider


Return immediately with acute worsening symptoms


***Med/Other Pt SpecificInfo:  Prescription(s) given


Scripts


Ibuprofen (Ibuprofen) 600 Mg Tab


600 MG PO Q8HR Y for PAIN, #30 TAB 0 Refills


   Prov: Erica Whiteside         18 


Chlorhexidine Gluconate (Mouth) Liq (Peridex Liq) 0.12% Soln


15 ML SWISH-SPIT BID, #473 ML 0 Refills


   Prov: Erica Whiteside         18 


Penicillin V Potassium (Penicillin V Potassium) 500 Mg Tab


500 MG PO Q6H for Infection for 10 Days, #40 TAB 0 Refills


   Prov: Erica Whiteside         18


Disposition:  01 DISCHARGE HOME


Condition:  Stable











Erica Whiteside 2018 18:02